# Patient Record
Sex: MALE | Race: WHITE
[De-identification: names, ages, dates, MRNs, and addresses within clinical notes are randomized per-mention and may not be internally consistent; named-entity substitution may affect disease eponyms.]

---

## 2018-01-07 ENCOUNTER — HOSPITAL ENCOUNTER (EMERGENCY)
Dept: HOSPITAL 25 - ED | Age: 48
Discharge: HOME | End: 2018-01-07
Payer: COMMERCIAL

## 2018-01-07 VITALS — SYSTOLIC BLOOD PRESSURE: 169 MMHG | DIASTOLIC BLOOD PRESSURE: 107 MMHG

## 2018-01-07 DIAGNOSIS — R45.851: Primary | ICD-10-CM

## 2018-01-07 LAB
BASOPHILS # BLD AUTO: 0 10^3/UL (ref 0–0.2)
EOSINOPHIL # BLD AUTO: 0.2 10^3/UL (ref 0–0.6)
HCT VFR BLD AUTO: 46 % (ref 42–52)
HGB BLD-MCNC: 15.6 G/DL (ref 14–18)
LYMPHOCYTES # BLD AUTO: 1.2 10^3/UL (ref 1–4.8)
MCH RBC QN AUTO: 28 PG (ref 27–31)
MCHC RBC AUTO-ENTMCNC: 34 G/DL (ref 31–36)
MCV RBC AUTO: 83 FL (ref 80–94)
MONOCYTES # BLD AUTO: 0.4 10^3/UL (ref 0–0.8)
NEUTROPHILS # BLD AUTO: 5.4 10^3/UL (ref 1.5–7.7)
NRBC # BLD AUTO: 0 10^3/UL
NRBC BLD QL AUTO: 0.1
PLATELET # BLD AUTO: 250 10^3/UL (ref 150–450)
RBC # BLD AUTO: 5.53 10^6/UL (ref 4–5.4)
WBC # BLD AUTO: 7.2 10^3/UL (ref 3.5–10.8)

## 2018-01-07 PROCEDURE — 80329 ANALGESICS NON-OPIOID 1 OR 2: CPT

## 2018-01-07 PROCEDURE — 85025 COMPLETE CBC W/AUTO DIFF WBC: CPT

## 2018-01-07 PROCEDURE — 80320 DRUG SCREEN QUANTALCOHOLS: CPT

## 2018-01-07 PROCEDURE — 80053 COMPREHEN METABOLIC PANEL: CPT

## 2018-01-07 PROCEDURE — G0480 DRUG TEST DEF 1-7 CLASSES: HCPCS

## 2018-01-07 PROCEDURE — 84443 ASSAY THYROID STIM HORMONE: CPT

## 2018-01-07 PROCEDURE — 99283 EMERGENCY DEPT VISIT LOW MDM: CPT

## 2018-01-07 PROCEDURE — 36415 COLL VENOUS BLD VENIPUNCTURE: CPT

## 2018-01-16 NOTE — ED
Paco TSE Natalie, scribed for Jaden Rodriguez MD on 01/07/18 at 1134 .





Psychiatric Complaint





- HPI Summary


HPI Summary: 


The pt is a 46 y/o M presenting to the [facility] ED c/o possible SI starting last 

night s/p arguing with wife. The pt and his wife having marital issues last 

night. This morning, the pt left a letter to his wife that started to sound a 

like a suicide note. The pt went to work, and he and his wife continued to 

fight over text, exchanging messages such as why shouldnt I just kill myself.

 His wife was worried so she called the police. He feels alright in the ED. Pt 

denies SI/HI.





- History Of Current Complaint


Chief Complaint: [facility]MentalHealth


Hx Obtained From: Patient


Onset/Duration: Lasting Hours, Resolved


Severity Initially: Moderate


Severity Currently: None


Character: Frustrated


Aggravating Factor(s): Nothing


Alleviating Factor(s): Nothing





- Allergies/Home Medications


Allergies/Adverse Reactions: 


 Allergies











Allergy/AdvReac Type Severity Reaction Status Date / Time


 


No Known Allergies Allergy   Verified 01/08/14 12:28














PMH/Surg Hx/FS Hx/Imm Hx


Previously Healthy: Yes


Endocrine/Hematology History: 


   Denies: Hx Diabetes, Hx Thyroid Disease


Cardiovascular History: 


   Denies: Hx Hypertension


Respiratory History: 


   Denies: Hx Asthma, Hx Chronic Obstructive Pulmonary Disease (COPD)


GI History: 


   Denies: Hx Ulcer


Infectious Disease History: No


Infectious Disease History: 


   Denies: Hx Clostridium Difficile, Hx Hepatitis, Hx Human Immunodeficiency 

Virus (HIV), Hx of Known/Suspected MRSA, Hx Shingles, Hx Tuberculosis, Traveled 

Outside the [place] in Last 30 Days





- Family History


Known Family History: Positive: Hypertension


   Negative: Cardiac Disease, Diabetes





- Social History


Substance Use Type: Reports: None





Review of Systems


Negative: Fever


Negative: Other - SI, HI


All Other Systems Reviewed And Are Negative: Yes





Physical Exam





- Summary


Physical Exam Summary: 


Appearance: Well-appearing, Well-nourished


Skin: Warm, dry


Eyes: Normal, Extraocular movements intact, PERRL


ENT: Normal, Mucus membranes moist 


Neck: Supple, nontender


Respiratory: Lung sounds clear to auscultation


Cardiovascular: Normal


Abdomen/GI: Soft, nontender, no distension


Musculoskeletal: Normal, Strength/ROM Intact, Normal strength and sensation in 

all upper and lower extremities


Neurological: Normal, A&Ox3


Psychiatric: Normal


Triage Information Reviewed: Yes


Vital Signs On Initial Exam: 


 Initial Vitals











Temp Pulse Resp BP Pulse Ox


 


 99.0 F   86   18   185/100   100 


 


 01/07/18 10:52  01/07/18 10:52  01/07/18 10:52  01/07/18 10:52  01/07/18 10:52











Vital Signs Reviewed: Yes





Diagnostics





- Vital Signs


 Vital Signs











  Temp Pulse Resp BP Pulse Ox


 


 01/07/18 10:52  99.0 F  86  18  185/100  100














- Laboratory


Lab Results: 


 Lab Results











  01/07/18 01/07/18 Range/Units





  11:43 11:43 


 


WBC   7.2  (3.5-10.8)  10^3/ul


 


RBC   5.53 H  (4.0-5.4)  10^6/ul


 


Hgb   15.6  (14.0-18.0)  g/dl


 


Hct   46  (42-52)  %


 


MCV   83  (80-94)  fL


 


MCH   28  (27-31)  pg


 


MCHC   34  (31-36)  g/dl


 


RDW   14  (10.5-15)  %


 


Plt Count   250  (150-450)  10^3/ul


 


MPV   8  (7.4-10.4)  um3


 


Neut % (Auto)   74.8  (38-83)  %


 


Lymph % (Auto)   17.1 L  (25-47)  %


 


Mono % (Auto)   5.3  (1-9)  %


 


Eos % (Auto)   2.1  (0-6)  %


 


Baso % (Auto)   0.7  (0-2)  %


 


Absolute Neuts (auto)   5.4  (1.5-7.7)  10^3/ul


 


Absolute Lymphs (auto)   1.2  (1.0-4.8)  10^3/ul


 


Absolute Monos (auto)   0.4  (0-0.8)  10^3/ul


 


Absolute Eos (auto)   0.2  (0-0.6)  10^3/ul


 


Absolute Basos (auto)   0  (0-0.2)  10^3/ul


 


Absolute Nucleated RBC   0  10^3/ul


 


Nucleated RBC %   0.1  


 


Sodium  134   (133-145)  mmol/L


 


Potassium  4.2   (3.5-5.0)  mmol/L


 


Chloride  102   (101-111)  mmol/L


 


Carbon Dioxide  23   (22-32)  mmol/L


 


Anion Gap  9   (2-11)  mmol/L


 


BUN  22   (6-24)  mg/dL


 


Creatinine  1.08   (0.67-1.17)  mg/dL


 


Est GFR ( Amer)  94.3   (>60)  


 


Est GFR (Non-Af Amer)  73.3   (>60)  


 


BUN/Creatinine Ratio  20.4 H   (8-20)  


 


Glucose  125 H   ()  mg/dL


 


Calcium  9.1   (8.6-10.3)  mg/dL


 


Total Bilirubin  0.60   (0.2-1.0)  mg/dL


 


AST  17   (13-39)  U/L


 


ALT  25   (7-52)  U/L


 


Alkaline Phosphatase  53   ()  U/L


 


Total Protein  7.3   (6.4-8.9)  g/dL


 


Albumin  4.5   (3.2-5.2)  g/dL


 


Globulin  2.8   (2-4)  g/dL


 


Albumin/Globulin Ratio  1.6   (1-3)  


 


TSH  1.27   (0.34-5.60)  mcIU/mL


 


Salicylates  < 2.50   (<30)  mg/dL


 


Acetaminophen  < 15   mcg/mL


 


Serum Alcohol  < 10   (<10)  mg/dL











Result Diagrams: 


 01/07/18 11:43





 01/07/18 11:43


Lab Statement: Any lab studies that have been ordered have been reviewed, and 

results considered in the medical decision making process.





Course/Dx





- Course


Assessment/Plan: MHE appreciated, pt deemed NOT to be a danger to himself or 

others at this time. Discharged with instructions for outpatient f/u.





- Differential Dx/Clinical Impression


Provider Diagnosis: 


 Suicidal ideation








Discharge





- Discharge Plan


Condition: Stable


Disposition: HOME


Referrals: 


No Primary Care Phys,NOPCP [Primary Care Provider] - 





The documentation as recorded by the Paco olivares Natalie accurately reflects 

the service I personally performed and the decisions made by me, Jaden Rodriguez MD.